# Patient Record
Sex: MALE | Race: BLACK OR AFRICAN AMERICAN | NOT HISPANIC OR LATINO | URBAN - METROPOLITAN AREA
[De-identification: names, ages, dates, MRNs, and addresses within clinical notes are randomized per-mention and may not be internally consistent; named-entity substitution may affect disease eponyms.]

---

## 2017-05-05 ENCOUNTER — EMERGENCY (EMERGENCY)
Facility: HOSPITAL | Age: 32
LOS: 1 days | Discharge: HOME | End: 2017-05-05
Attending: PERSONAL EMERGENCY RESPONSE ATTENDANT | Admitting: PERSONAL EMERGENCY RESPONSE ATTENDANT
Payer: MEDICAID

## 2017-05-05 VITALS
RESPIRATION RATE: 18 BRPM | SYSTOLIC BLOOD PRESSURE: 125 MMHG | DIASTOLIC BLOOD PRESSURE: 82 MMHG | OXYGEN SATURATION: 97 % | HEART RATE: 78 BPM | TEMPERATURE: 99 F

## 2017-05-05 DIAGNOSIS — S99.922A UNSPECIFIED INJURY OF LEFT FOOT, INITIAL ENCOUNTER: ICD-10-CM

## 2017-05-05 DIAGNOSIS — Z98.89 OTHER SPECIFIED POSTPROCEDURAL STATES: Chronic | ICD-10-CM

## 2017-05-05 PROCEDURE — 99284 EMERGENCY DEPT VISIT MOD MDM: CPT | Mod: 25

## 2017-05-05 PROCEDURE — 73610 X-RAY EXAM OF ANKLE: CPT | Mod: 26,LT

## 2017-05-05 PROCEDURE — 73630 X-RAY EXAM OF FOOT: CPT

## 2017-05-05 PROCEDURE — 99283 EMERGENCY DEPT VISIT LOW MDM: CPT

## 2017-05-05 PROCEDURE — 73630 X-RAY EXAM OF FOOT: CPT | Mod: 26,LT

## 2017-05-05 PROCEDURE — 73610 X-RAY EXAM OF ANKLE: CPT

## 2017-05-05 NOTE — ED PROVIDER NOTE - MEDICAL DECISION MAKING DETAILS
[employer]:  Pt does not remember his last tetanus shot.  Advised of ppx concern and reasons to be vaccinated.  PT is unsure if he would like to be vaccinated today and states that he will 'think about it'.  Refuses tylenol/motrin on arrival.

## 2017-05-05 NOTE — ED PROVIDER NOTE - OBJECTIVE STATEMENT
Pt is a 31 yr old male without chronic medical problems presenting to ED with complaint of L foot injury in which pt kicked a shredder out of frustration sustaining a puncture wound to dorsum of L foot immediately followed by accidentally inverting the L foot while ambulating down stairs.  No falls/head injury/syncope.  Pt initially complained of L ankle pain however on exam in ED denies ankle pain and is nontender at bilateral maleoli.  Pain is focal to dorsal foot wound and diffuse over midshaft metatarsals.  Pt is neurovascularly intact distal to site.  Minimal focal swelling over punctate wound ~0.5 cm puncture to dorsum of foot.  Bleeding well controlled.  Pt does not remember his last tetanus shot.  Has not taken tylenol/motrin for pain.  States he's been minimally ambulatory since event.

## 2017-05-05 NOTE — ED PROVIDER NOTE - PHYSICAL EXAMINATION
Pt initially complained of L ankle pain however on exam in ED denies ankle pain and is nontender at bilateral maleoli.  Pain is focal to dorsal foot wound and diffuse over midshaft metatarsals.  Pt is neurovascularly intact distal to site.  Minimal focal swelling over punctate wound ~0.5 cm puncture to dorsum of foot.  Bleeding well controlled.  No TTP over L knee/tib/fib.  No TTP over plantar aspect of foot.

## 2017-05-05 NOTE — ED ADULT NURSE NOTE - OBJECTIVE STATEMENT
31 y.o male c c/o ankle injury. Pt cut the top of foot when he kicked a shredder- Pt then missed a step. No edema noted to foot. Family at bedside. Pain upon ambulation.

## 2017-05-05 NOTE — ED PROCEDURE NOTE - PROCEDURE ADDITIONAL DETAILS
Pt refuses tetanus shot.  L hard soled shoe placed for comfort. Remains neurovascularly intact distal to site after placement.  Wound cleansed with saline and bacitracin applied with gauze.  Crutches fitted to pt.

## 2017-05-05 NOTE — ED PROVIDER NOTE - PROGRESS NOTE DETAILS
[employer]:  XR's reviewed and non-actionable.  Pt refuses tetanus shot.  L hard soled shoe placed for comfort. Remains neurovascularly intact distal to site after placement.  Wound cleansed with saline and bacitracin applied with gauze.  Crutches fitted to pt.  Given information for orthopedics for follow-up as needed for persistent pain.  Note for work.  Rest, ice, elevation, motrin Q6h.

## 2019-01-07 NOTE — ED ADULT TRIAGE NOTE - ACCOMPANIED BY
Albuterol neb and flovent mdi given BID. BBS EW and coarse. Pt wore 2L overnight, will continue to try to wean O2 as sherrell.  
Spouse/Significant other

## 2023-04-17 ENCOUNTER — EMERGENCY (EMERGENCY)
Facility: HOSPITAL | Age: 38
LOS: 1 days | End: 2023-04-17
Attending: STUDENT IN AN ORGANIZED HEALTH CARE EDUCATION/TRAINING PROGRAM
Payer: MEDICAID

## 2023-04-17 VITALS
WEIGHT: 199.96 LBS | HEIGHT: 72 IN | RESPIRATION RATE: 16 BRPM | DIASTOLIC BLOOD PRESSURE: 86 MMHG | OXYGEN SATURATION: 100 % | TEMPERATURE: 98 F | HEART RATE: 65 BPM | SYSTOLIC BLOOD PRESSURE: 135 MMHG

## 2023-04-17 DIAGNOSIS — Z98.89 OTHER SPECIFIED POSTPROCEDURAL STATES: Chronic | ICD-10-CM

## 2023-04-17 PROCEDURE — 99284 EMERGENCY DEPT VISIT MOD MDM: CPT

## 2023-04-18 PROCEDURE — 99283 EMERGENCY DEPT VISIT LOW MDM: CPT

## 2023-04-18 RX ORDER — IBUPROFEN 200 MG
1 TABLET ORAL
Qty: 28 | Refills: 0
Start: 2023-04-18 | End: 2023-04-24

## 2023-04-18 RX ORDER — METHOCARBAMOL 500 MG/1
1500 TABLET, FILM COATED ORAL ONCE
Refills: 0 | Status: COMPLETED | OUTPATIENT
Start: 2023-04-18 | End: 2023-04-18

## 2023-04-18 RX ORDER — ACETAMINOPHEN 500 MG
975 TABLET ORAL ONCE
Refills: 0 | Status: COMPLETED | OUTPATIENT
Start: 2023-04-18 | End: 2023-04-18

## 2023-04-18 RX ORDER — METHOCARBAMOL 500 MG/1
2 TABLET, FILM COATED ORAL
Qty: 42 | Refills: 0
Start: 2023-04-18 | End: 2023-04-24

## 2023-04-18 RX ORDER — IBUPROFEN 200 MG
600 TABLET ORAL ONCE
Refills: 0 | Status: COMPLETED | OUTPATIENT
Start: 2023-04-18 | End: 2023-04-18

## 2023-04-18 RX ORDER — LIDOCAINE 4 G/100G
1 CREAM TOPICAL ONCE
Refills: 0 | Status: COMPLETED | OUTPATIENT
Start: 2023-04-18 | End: 2023-04-18

## 2023-04-18 RX ORDER — ACETAMINOPHEN 500 MG
2 TABLET ORAL
Qty: 56 | Refills: 0
Start: 2023-04-18 | End: 2023-04-24

## 2023-04-18 RX ADMIN — LIDOCAINE 1 PATCH: 4 CREAM TOPICAL at 00:46

## 2023-04-18 RX ADMIN — Medication 600 MILLIGRAM(S): at 00:46

## 2023-04-18 RX ADMIN — METHOCARBAMOL 1500 MILLIGRAM(S): 500 TABLET, FILM COATED ORAL at 00:47

## 2023-04-18 RX ADMIN — Medication 975 MILLIGRAM(S): at 00:46

## 2023-04-18 NOTE — ED PROVIDER NOTE - OBJECTIVE STATEMENT
pettet attending- 38yo m pw r sided back pain since 5pm. Denies specific injury reports felt spasm of right lower back at 5 PM otherwise denies bladder or bowel habit changes denies incontinence denies numbness or saddle anesthesia of lower extremities.  No history of similar back pain denies medications prior to arrival denies fever chills nausea vomiting diarrhea, chest pain shortness of breath

## 2023-04-18 NOTE — ED PROVIDER NOTE - CLINICAL SUMMARY MEDICAL DECISION MAKING FREE TEXT BOX
jorge attending- The patient presents with acute onset of back pain. I believe this patient can be ruled out for serious pathology given there is a completely normal neurological exam, no history of malignancy, immunocompromised state, history of IV drug use, weight loss, saddle anesthesia, bowel or bladder incontinence, trauma to spine, fevers, chills, diaphoresis, acute bony tenderness, morning stiffness lasting >30 minutes. Patient was able to ambulate without assistance. Will give analgesia, reassess

## 2023-04-18 NOTE — ED PROVIDER NOTE - PATIENT PORTAL LINK FT
You can access the FollowMyHealth Patient Portal offered by [facility] by registering at the following website: [email or web address]. By joining [email or web address] FollowMyHealth portal, you will also be able to view your health information using other applications (apps) compatible with our system.

## 2023-04-18 NOTE — ED PROVIDER NOTE - NSFOLLOWUPINSTRUCTIONS_ED_ALL_ED_FT
1. TAKE ALL MEDICATIONS AS DIRECTED.    2. FOR PAIN OR FEVER YOU CAN TAKE IBUPROFEN (MOTRIN, ADVIL) OR ACETAMINOPHEN (TYLENOL) AS NEEDED, AS DIRECTED ON PACKAGING.  3. FOLLOW UP WITH YOUR PRIMARY DOCTOR WITHIN 5 DAYS AS DIRECTED.  4. IF YOU HAD LABS OR IMAGING DONE, YOU WERE GIVEN COPIES OF ALL LABS AND/OR IMAGING RESULTS FROM YOUR ER VISIT--PLEASE TAKE THEM WITH YOU TO YOUR FOLLOW UP APPOINTMENTS.  5. RETURN TO THE ER FOR ANY WORSENING SYMPTOMS OR CONCERNS.    Back Pain    You were seen in the emergency department (ED) today for back pain. Acute back pain is the second most common reason for a physician visit and affects 80% to 85% of people over their lifetime. Most episodes of back pain are not serious and resolve within weeks with conservative therapy.    There are many causes of back pain. Most of the time, the pain is caused by conditions such as a muscle strain, inflammation or a bulging disc that cannot be identified on an X-ray or CT scan. Diagnostic imaging does not accurately identify the cause of most low back pain and therefore does not help guide therapy or improve the time to recovery.    Back pain is very common in adults. The cause of back pain is rarely dangerous and the pain often gets better over time. The cause of your back pain may not be known and may include strain of muscles or ligaments, degeneration of the spinal disks, or arthritis. Occasionally the pain may radiate down your leg(s). Over-the-counter medicines to reduce pain and inflammation are often the most helpful. Stretching and remaining active frequently helps the healing process.    Your provider today has determined that you are not exhibiting any of these worrisome symptoms or physical exam findings. The American College of Emergency Physicians, American College of Physicians, American Society of Anesthesiologists, and the American College of Radiology have all independently advised that acute imaging studies in patients with musculoskeletal low back pain are usually inappropriate and not necessary.    Your provider today has determined that you do not need an emergent MRI. This does not mean that you may not require an MRI as an outpatient in the future if your pain persists or if you develop additional neurologic symptoms.    It is extremely important for you to follow up with your outpatient provider for further examination and discussion regarding treatment and imaging, if necessary.    If you develop any of the following symptoms, return to the ED immediately for re-evaluation:    •Significant trauma or fall, especially if you are over age 65 or have osteoporosis  •Sudden, acute onset of urinary retention or incontinence  •Fecal incontinence  •Loss of sensation (anesthesia) restricted to the area of the buttocks, perineum and inner surfaces of the thighs  •Weakness in the lower limbs

## 2023-04-18 NOTE — ED PROVIDER NOTE - PHYSICAL EXAMINATION
Physical Exam:  Gen: NAD, AOx3, non-toxic appearing  Head: normal appearing  HEENT: normal conjunctiva, oral mucosa moist  Lung:  no respiratory distress, speaking in full sentences, clear to ascultation bilaterally     CV: regular rate and rhythm   Abd: soft, ND, NT  MSK: no visible deformities, r paraspinal ttp no ctl midline spinal ttp MAEx4 no weakness neurovasc intact distally   Neuro: No focal deficits  Skin: Warm  Psych: normal affect  ~Jose Jin D.O. -ED Attending

## 2023-04-27 ENCOUNTER — APPOINTMENT (OUTPATIENT)
Dept: ORTHOPEDIC SURGERY | Facility: CLINIC | Age: 38
End: 2023-04-27
Payer: MEDICAID

## 2023-04-27 VITALS
SYSTOLIC BLOOD PRESSURE: 133 MMHG | HEART RATE: 65 BPM | DIASTOLIC BLOOD PRESSURE: 85 MMHG | BODY MASS INDEX: 29.4 KG/M2 | WEIGHT: 210 LBS | HEIGHT: 71 IN

## 2023-04-27 DIAGNOSIS — S39.012A STRAIN OF MUSCLE, FASCIA AND TENDON OF LOWER BACK, INITIAL ENCOUNTER: ICD-10-CM

## 2023-04-27 DIAGNOSIS — Z78.9 OTHER SPECIFIED HEALTH STATUS: ICD-10-CM

## 2023-04-27 DIAGNOSIS — M54.50 LOW BACK PAIN, UNSPECIFIED: ICD-10-CM

## 2023-04-27 PROCEDURE — 99204 OFFICE O/P NEW MOD 45 MIN: CPT

## 2023-04-27 PROCEDURE — 72100 X-RAY EXAM L-S SPINE 2/3 VWS: CPT

## 2023-04-27 NOTE — ADDENDUM
[FreeTextEntry1] : This note was written by Peng Sandoval on 04/27/2023 acting as scribe for Dr. Sarthak Saeed M.D.\par \par I, Sarthak Saeed MD, have read and attest that all the information, medical decision making and discharge instructions within are true and accurate.

## 2023-04-27 NOTE — PHYSICAL EXAM
[Normal] : Gait: normal [Novak's Sign] : negative Novak's sign [Pronator Drift] : negative pronator drift [SLR] : negative straight leg raise [de-identified] : 5 out of 5 motor strength, sensation is intact and symmetrical full range of motion flexion extension and rotation, no palpatory tenderness full range of motion of hips knees shoulders and elbows (all four extremities), no atrophy, negative straight leg raise, no pathological reflexes, no swelling, normal ambulation, no apparent distress skin is intact, no palpable lymph nodes, no upper or lower extremity instability, alert and oriented x3 and normal mood. Normal finger-to nose test.\par Right SI joint pain. [de-identified] : XR AP Lat Lumbar 04/27/2023 -adequate- reviewed with patient.

## 2023-04-27 NOTE — HISTORY OF PRESENT ILLNESS
[Improving] : improving [de-identified] : 37 year old male presents for initial evaluation of lower back pain since 4/17/23. \par Denies injury.\par Pain radiates to the right lateral thigh intermittently.\par States that he also feels an altered sensation of his RLE. \par Prolonged standing and sitting aggravates the pain, as well as bending.\par He [facility] ED on 4/17/23 due to the pain.\par Has not taken naproxen and had mild relief. \par Has not tried PT or chiropractic care. \par No fever, chills, sweats, nausea/vomiting. No bowel or bladder dysfunction, no recent weight loss or gain. No night pain. This history is in addition to the intake form that I personally reviewed.

## 2023-04-27 NOTE — DISCUSSION/SUMMARY
[de-identified] : Right sacroiliitis.\par Lumbar sprain and strain.\par He is getting significantly better.\par Discussed all options.\par Diclofenac PRN.\par Referral for physical therapy. \par All options discussed including rest, medicine, home exercise, acupuncture, Chiropractic care, Physical Therapy, Pain management, and last resort surgery. All questions were answered, all alternatives discussed and the patient is in complete agreement with the treatment plan which the patient contributed to and discussed with me through the shared decision making process. Follow-up appointment as instructed. Any issues and the patient will call or come in sooner.

## 2023-09-29 NOTE — ED ADULT TRIAGE NOTE - TEMPERATURE IN CELSIUS (DEGREES C)
Quality 128: Preventive Care And Screening: Body Mass Index (Bmi) Screening And Follow-Up Plan: BMI not documented, reason not otherwise specified.
Detail Level: Detailed
Quality 431: Preventive Care And Screening: Unhealthy Alcohol Use - Screening: Patient screened for unhealthy alcohol use using a single question and scores less than 2 times per year
Quality 226: Preventive Care And Screening: Tobacco Use: Screening And Cessation Intervention: Patient screened for tobacco use and is an ex/non-smoker
36.6
Quality 130: Documentation Of Current Medications In The Medical Record: Current Medications Documented
Quality 111:Pneumonia Vaccination Status For Older Adults: Pneumococcal Vaccination Previously Received
Quality 47: Advance Care Plan: Advance Care Planning discussed and documented in the medical record; patient did not wish or was not able to name a surrogate decision maker or provide an advance care plan.

## 2024-02-21 ENCOUNTER — APPOINTMENT (OUTPATIENT)
Dept: GASTROENTEROLOGY | Facility: CLINIC | Age: 39
End: 2024-02-21
Payer: MEDICAID

## 2024-02-21 VITALS
SYSTOLIC BLOOD PRESSURE: 123 MMHG | BODY MASS INDEX: 32.48 KG/M2 | DIASTOLIC BLOOD PRESSURE: 81 MMHG | HEART RATE: 75 BPM | TEMPERATURE: 98.2 F | WEIGHT: 232 LBS | HEIGHT: 71 IN | RESPIRATION RATE: 17 BRPM | OXYGEN SATURATION: 98 %

## 2024-02-21 DIAGNOSIS — K92.1 MELENA: ICD-10-CM

## 2024-02-21 PROCEDURE — 99203 OFFICE O/P NEW LOW 30 MIN: CPT

## 2024-02-21 RX ORDER — DICLOFENAC SODIUM 75 MG/1
75 TABLET, DELAYED RELEASE ORAL
Qty: 60 | Refills: 1 | Status: DISCONTINUED | COMMUNITY
Start: 2023-04-27 | End: 2024-02-21